# Patient Record
Sex: FEMALE | Race: WHITE | Employment: OTHER | URBAN - METROPOLITAN AREA
[De-identification: names, ages, dates, MRNs, and addresses within clinical notes are randomized per-mention and may not be internally consistent; named-entity substitution may affect disease eponyms.]

---

## 2019-06-11 ENCOUNTER — OFFICE VISIT (OUTPATIENT)
Dept: FAMILY MEDICINE | Facility: CLINIC | Age: 79
End: 2019-06-11
Payer: MEDICARE

## 2019-06-11 VITALS
HEART RATE: 97 BPM | HEIGHT: 59 IN | OXYGEN SATURATION: 92 % | BODY MASS INDEX: 19.76 KG/M2 | TEMPERATURE: 98.1 F | DIASTOLIC BLOOD PRESSURE: 71 MMHG | WEIGHT: 98 LBS | SYSTOLIC BLOOD PRESSURE: 123 MMHG

## 2019-06-11 DIAGNOSIS — R91.1 LUNG NODULE: ICD-10-CM

## 2019-06-11 DIAGNOSIS — J18.9 PNEUMONIA OF LOWER LOBE DUE TO INFECTIOUS ORGANISM, UNSPECIFIED LATERALITY: Primary | ICD-10-CM

## 2019-06-11 DIAGNOSIS — I10 HYPERTENSION GOAL BP (BLOOD PRESSURE) < 140/90: ICD-10-CM

## 2019-06-11 LAB
ANION GAP SERPL CALCULATED.3IONS-SCNC: 10 MMOL/L (ref 3–14)
BUN SERPL-MCNC: 11 MG/DL (ref 7–30)
CALCIUM SERPL-MCNC: 9.1 MG/DL (ref 8.5–10.1)
CHLORIDE SERPL-SCNC: 93 MMOL/L (ref 94–109)
CO2 SERPL-SCNC: 29 MMOL/L (ref 20–32)
CREAT SERPL-MCNC: 0.47 MG/DL (ref 0.52–1.04)
GFR SERPL CREATININE-BSD FRML MDRD: >90 ML/MIN/{1.73_M2}
GLUCOSE SERPL-MCNC: 104 MG/DL (ref 70–99)
POTASSIUM SERPL-SCNC: 3.3 MMOL/L (ref 3.4–5.3)
SODIUM SERPL-SCNC: 132 MMOL/L (ref 133–144)

## 2019-06-11 PROCEDURE — 99204 OFFICE O/P NEW MOD 45 MIN: CPT | Performed by: NURSE PRACTITIONER

## 2019-06-11 PROCEDURE — 36415 COLL VENOUS BLD VENIPUNCTURE: CPT | Performed by: NURSE PRACTITIONER

## 2019-06-11 PROCEDURE — 80048 BASIC METABOLIC PNL TOTAL CA: CPT | Performed by: NURSE PRACTITIONER

## 2019-06-11 PROCEDURE — 85025 COMPLETE CBC W/AUTO DIFF WBC: CPT | Performed by: NURSE PRACTITIONER

## 2019-06-11 RX ORDER — ASPIRIN 81 MG/1
81 TABLET ORAL DAILY
COMMUNITY

## 2019-06-11 RX ORDER — IBUPROFEN 200 MG
1 CAPSULE ORAL 2 TIMES DAILY
COMMUNITY

## 2019-06-11 RX ORDER — LOSARTAN POTASSIUM AND HYDROCHLOROTHIAZIDE 25; 100 MG/1; MG/1
1 TABLET ORAL DAILY
COMMUNITY

## 2019-06-11 RX ORDER — ACETAMINOPHEN 160 MG
2000 TABLET,DISINTEGRATING ORAL DAILY
COMMUNITY

## 2019-06-11 RX ORDER — CEFDINIR 300 MG/1
300 CAPSULE ORAL 2 TIMES DAILY
COMMUNITY

## 2019-06-11 RX ORDER — AMLODIPINE BESYLATE 10 MG/1
10 TABLET ORAL DAILY
COMMUNITY

## 2019-06-11 ASSESSMENT — PAIN SCALES - GENERAL: PAINLEVEL: NO PAIN (0)

## 2019-06-11 ASSESSMENT — MIFFLIN-ST. JEOR: SCORE: 807.22

## 2019-06-11 NOTE — PATIENT INSTRUCTIONS
Schedule a visit with a new primary care provider in [place]. This is very important and I would schedule this for next week  We did blood work today and I will mail those results to you in [place]  You will need to be seen for follow up of the lung nodule. Please discuss this with your new primary care provider   It would be helpful to get a copy of your records from [facility] for your new provider to review. After you know what clinic you will be going to you could call [facility] and ask them to mail a CD of the images from your CT scan to your new provider

## 2019-06-11 NOTE — LETTER
[facility]  [address] [place]  [address]  [phone]        June 13, 2019    Sandrine Barney  [address] [address]  [facility] [address]        Dear [place],    The results of your recent labs are enclosed.   Your platelets are high. They are essential for your blood clotting. I don't have any previous labs to compare them too. Sometimes they can increase after infection and this could be from your recent pneumonia. This lab should be repeated when you meet your new primary care provider next week. You may also need another blood test called a peripheral smear.   It is very important that you are seen next week at a clinic in [place] to repeat blood work and get referral to a lung nodule clinic.   I hope everything goes well with your move and that you are doing well.   Please call the clinic if you have any concerns.     Results for orders placed or performed in visit on 06/11/19   Basic metabolic panel   Result Value Ref Range    Sodium 132 (L) 133 - 144 mmol/L    Potassium 3.3 (L) 3.4 - 5.3 mmol/L    Chloride 93 (L) 94 - 109 mmol/L    Carbon Dioxide 29 20 - 32 mmol/L    Anion Gap 10 3 - 14 mmol/L    Glucose 104 (H) 70 - 99 mg/dL    Urea Nitrogen 11 7 - 30 mg/dL    Creatinine 0.47 (L) 0.52 - 1.04 mg/dL    GFR Estimate >90 >60 mL/min/[1.73_m2]    GFR Estimate If Black >90 >60 mL/min/[1.73_m2]    Calcium 9.1 8.5 - 10.1 mg/dL   **CBC with platelets differential FUTURE 2mo   Result Value Ref Range    WBC 10.2 4.0 - 11.0 10e9/L    RBC Count 3.94 3.8 - 5.2 10e12/L    Hemoglobin 12.8 11.7 - 15.7 g/dL    Hematocrit 36.5 35.0 - 47.0 %    MCV 93 78 - 100 fl    MCH 32.5 26.5 - 33.0 pg    MCHC 35.1 31.5 - 36.5 g/dL    RDW 13.6 10.0 - 15.0 %    Platelet Count 642 (H) 150 - 450 10e9/L    % Neutrophils 80.0 %    % Lymphocytes 10.0 %    % Monocytes 7.0 %    % Eosinophils 3.0 %    Absolute Neutrophil 8.2 1.6 - 8.3 10e9/L    Absolute Lymphocytes 1.0 0.8 - 5.3 10e9/L    Absolute Monocytes 0.7 0.0 - 1.3  10e9/L    Absolute Eosinophils 0.3 0.0 - 0.7 10e9/L    RBC Morphology Normal     Platelet Estimate       Automated count confirmed.  Platelet morphology is normal.    Diff Method Manual Differential        If you have any questions please call the clinic at [phone].    Sincerely,    Danisha SAMUEL CNP  LMD

## 2019-06-11 NOTE — PROGRESS NOTES
Subjective     Sandrine Barney is a 79 year old female who presents to clinic today for the following health issues:    HPI       Hospital Follow-up Visit:    Hospital/Nursing Home/IP Rehab Facility: [facility]  Date of Admission: 6/3/19  Date of Discharge: 6/6/19  Reason(s) for Admission: Pneumonia            Problems taking medications regularly:  None       Medication changes since discharge: Put on a antibiotic       Problems adhering to non-medication therapy:  None    Summary of hospitalization: Unable to access any records from [facility]  Diagnostic Tests/Treatments reviewed.  Follow up needed: PHYSICAL THERAPY/OT which patient declined  Other Healthcare Providers Involved in Patient s Care:         lung nodule clinic  Update since discharge: improving. Still coughing, but that is improving along with improved short of breath. She feels weak which is gradually improving. Her daughter is making healthy meals for her    Post Discharge Medication Reconciliation: discharge medications reconciled, continue medications without change.  Plan of care communicated with patient and family     Coding guidelines for this visit:  Type of Medical   Decision Making Face-to-Face Visit       within 7 Days of discharge Face-to-Face Visit        within 14 days of discharge   Moderate Complexity [id number]   High Complexity [id number]            Patient Active Problem List   Diagnosis     Hypertension goal BP (blood pressure) < 140/90     History reviewed. No pertinent surgical history.    Social History     Tobacco Use     Smoking status: Never Smoker     Smokeless tobacco: Never Used   Substance Use Topics     Alcohol use: Yes     History reviewed. No pertinent family history.      Current Outpatient Medications   Medication Sig Dispense Refill     amLODIPine (NORVASC) 10 MG tablet Take 10 mg by mouth daily       aspirin 81 MG EC tablet Take 81 mg by mouth daily       calcium 600 MG tablet Take 1 tablet by mouth 2 times  "daily       cefdinir (OMNICEF) 300 MG capsule Take 300 mg by mouth 2 times daily       Cholecalciferol (VITAMIN D3) 2000 units CAPS Take 2,000 Int'l Units by mouth daily       losartan-hydrochlorothiazide (HYZAAR) 100-25 MG tablet Take 1 tablet by mouth daily       BP Readings from Last 3 Encounters:   06/11/19 123/71    Wt Readings from Last 3 Encounters:   06/11/19 44.5 kg (98 lb)         She is here today with her  (whom I am also seeing for hospital follow up)  They lived in [place] over 25 years and at the urging of their children are moving up to [place]  They have been staying with their daughter in   They plan to travel to [place] in 2 days and will be staying with their son until their apartment is ready  She has her AVS with her today  Noted to get BMP and CBC at f/u visit though she does not know of abnormal labs during hospitalization  Denies history of anemia  Referral to lung nodule clinic also noted on AVS  Her and her  remember being told to follow up on this but they do not have any further details or records of what was seen on imaging during hospital stay            Reviewed and updated as needed this visit by Provider         Review of Systems   ROS COMP: Constitutional, HEENT, cardiovascular, pulmonary, GI, , musculoskeletal, neuro, skin, endocrine and psych systems are negative, except as otherwise noted.      Objective    /71 (BP Location: Right arm, Patient Position: Chair, Cuff Size: Adult Small)   Pulse 97   Temp 98.1  F (36.7  C) (Oral)   Ht 1.486 m (4' 10.5\")   Wt 44.5 kg (98 lb)   SpO2 92%   Breastfeeding? No   BMI 20.13 kg/m    Body mass index is 20.13 kg/m .  Physical Exam   GENERAL: alert, no distress, frail and elderly  HENT: ear canals and TM's normal, nose and mouth without ulcers or lesions  NECK: no adenopathy, no asymmetry, masses, or scars and thyroid normal to palpation  RESP: lung sounds coarse, no crackles or wheeze  CV: regular rate and rhythm, " normal S1 S2, no S3 or S4, no murmur, click or rub, no peripheral edema   ABDOMEN: soft, nontender, no hepatosplenomegaly, no masses and bowel sounds normal  MS: gait slow and age appropriate with use of cane  NEURO: Normal strength and tone, mentation intact and speech normal  PSYCH: mentation appears normal, affect normal/bright    Diagnostic Test Results:  None        Assessment & Plan       ICD-10-CM    1. Pneumonia of lower lobe due to infectious organism, unspecified laterality (H) J18.1 Basic metabolic panel     CBC with platelets   2. Lung nodule R91.1    3. Hypertension goal BP (blood pressure) < 140/90 I10         Per her report, she is improving. She has not yet completed antibiotics prescribed at discharge  Unfortunately, I do not have any records from her hospitalization or previous primary care  She will be moving to [place] in 2 days  Will checks labs per discharge instructions  I strongly encouraged her and her  to schedule with a new primary care provider for early next week  She will need close follow up to ensure complete resolution of pneumonia  Also needs to establish care with pulmonologist for monitoring of lung nodule(s)  They voiced understanding  They will also call [facility] to have these records sent to their new clinic      Patient Instructions   Schedule a visit with a new primary care provider in [place]. This is very important and I would schedule this for next week  We did blood work today and I will mail those results to you in [place]  You will need to be seen for follow up of the lung nodule. Please discuss this with your new primary care provider   It would be helpful to get a copy of your records from [facility] for your new provider to review. After you know what clinic you will be going to you could call [facility] and ask them to mail a CD of the images from your CT scan to your new provider     More than 25 minutes spent with patient, more than 50% of which was spent on counseling and  coordination of care.      [facility]

## 2019-06-12 LAB
DIFFERENTIAL METHOD BLD: ABNORMAL
EOSINOPHIL # BLD AUTO: 0.3 10E9/L (ref 0–0.7)
EOSINOPHIL NFR BLD AUTO: 3 %
ERYTHROCYTE [DISTWIDTH] IN BLOOD BY AUTOMATED COUNT: 13.6 % (ref 10–15)
HCT VFR BLD AUTO: 36.5 % (ref 35–47)
HGB BLD-MCNC: 12.8 G/DL (ref 11.7–15.7)
LYMPHOCYTES # BLD AUTO: 1 10E9/L (ref 0.8–5.3)
LYMPHOCYTES NFR BLD AUTO: 10 %
MCH RBC QN AUTO: 32.5 PG (ref 26.5–33)
MCHC RBC AUTO-ENTMCNC: 35.1 G/DL (ref 31.5–36.5)
MCV RBC AUTO: 93 FL (ref 78–100)
MONOCYTES # BLD AUTO: 0.7 10E9/L (ref 0–1.3)
MONOCYTES NFR BLD AUTO: 7 %
NEUTROPHILS # BLD AUTO: 8.2 10E9/L (ref 1.6–8.3)
NEUTROPHILS NFR BLD AUTO: 80 %
PLATELET # BLD AUTO: 642 10E9/L (ref 150–450)
PLATELET # BLD EST: ABNORMAL 10*3/UL
RBC # BLD AUTO: 3.94 10E12/L (ref 3.8–5.2)
RBC MORPH BLD: NORMAL
WBC # BLD AUTO: 10.2 10E9/L (ref 4–11)

## 2019-06-13 NOTE — RESULT ENCOUNTER NOTE
[address] [address]  Phone: [phone]  Fax: [phone]      06/13/19    Sandrine Barney  [address] [address]  [facility] [address]      Dear [place],    The results of your recent labs are enclosed.  Your platelets are high. They are essential for your blood clotting. I don't have any previous labs to compare them too. Sometimes they can increase after infection and this could be from your recent pneumonia. This lab should be repeated when you meet your new primary care provider next week. You may also need another blood test called a peripheral smear.   It is very important that you are seen next week at a clinic in [place] to repeat blood work and get referral to a lung nodule clinic.  I hope everything goes well with your move and that you are doing well.   Please call the clinic if you have any concerns.    Sincerely,    LIZZIE Leggett CNP    Your [facility] Care Team

## 2019-06-13 NOTE — RESULT ENCOUNTER NOTE
I called patient to discuss lab results. She is planning to move to [place] today. I received a voicemail and left message for her to return call. Wanted to discuss high platelet count and need to see new primary care provider in [place] early next week and recheck labs.